# Patient Record
Sex: FEMALE | Race: WHITE | NOT HISPANIC OR LATINO | URBAN - METROPOLITAN AREA
[De-identification: names, ages, dates, MRNs, and addresses within clinical notes are randomized per-mention and may not be internally consistent; named-entity substitution may affect disease eponyms.]

---

## 2017-05-09 ENCOUNTER — OFFICE (OUTPATIENT)
Dept: URBAN - METROPOLITAN AREA CLINIC 101 | Facility: CLINIC | Age: 72
End: 2017-05-09

## 2017-05-09 VITALS
TEMPERATURE: 97.7 F | WEIGHT: 173 LBS | HEIGHT: 64 IN | DIASTOLIC BLOOD PRESSURE: 70 MMHG | HEART RATE: 65 BPM | SYSTOLIC BLOOD PRESSURE: 113 MMHG

## 2017-05-09 DIAGNOSIS — K62.5 HEMORRHAGE OF ANUS AND RECTUM: ICD-10-CM

## 2017-05-09 DIAGNOSIS — K59.09 OTHER CONSTIPATION: ICD-10-CM

## 2017-05-09 DIAGNOSIS — K22.70 BARRETT'S ESOPHAGUS WITHOUT DYSPLASIA: ICD-10-CM

## 2017-05-09 DIAGNOSIS — Z95.5 PRESENCE OF CORONARY ANGIOPLASTY IMPLANT AND GRAFT: ICD-10-CM

## 2017-05-09 PROCEDURE — 99214 OFFICE O/P EST MOD 30 MIN: CPT

## 2017-05-09 NOTE — SERVICEHPINOTES
This 71 YO patient is here to schedule an EGD and colonoscopy for Alcala's and h/o colon polyps. She feels well and is taking  Dexilant. She had Nissen fundlopication last year at [facility] which helped eliminate her coughing. She was doing well until viral illness in the winter made her vomit profusely. After this, some of her chronic cough returned. She coughs every 2-3 nights now, and she has not started any new medications. She denies heartburn, nausea, vomiting. 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 Her BRAVO revealed a very positive acid reflux study, and she had the Nissen. She takes Dexilant now.  She has chronic constipation and has a BSS type 1-2 bowel movement every 2-3 days. She also has intermittent rectal bleeding, assumed from hemorrhoid. No recent bleeding. She denies constipation, diarrhea, weight loss, and she is adopted . Her last colonoscopy in 2011 with Dr Mendiola did not reveal any polyps.

## 2017-07-05 ENCOUNTER — ON CAMPUS - OUTPATIENT (OUTPATIENT)
Dept: URBAN - METROPOLITAN AREA HOSPITAL 35 | Facility: HOSPITAL | Age: 72
End: 2017-07-05

## 2017-07-05 DIAGNOSIS — D12.4 BENIGN NEOPLASM OF DESCENDING COLON: ICD-10-CM

## 2017-07-05 DIAGNOSIS — K21.9 GASTRO-ESOPHAGEAL REFLUX DISEASE WITHOUT ESOPHAGITIS: ICD-10-CM

## 2017-07-05 DIAGNOSIS — K62.5 HEMORRHAGE OF ANUS AND RECTUM: ICD-10-CM

## 2017-07-05 PROCEDURE — 43235 EGD DIAGNOSTIC BRUSH WASH: CPT

## 2017-07-05 PROCEDURE — 45385 COLONOSCOPY W/LESION REMOVAL: CPT

## 2020-09-02 ENCOUNTER — TELEHEALTH PROVIDED OTHER THAN IN PATIENT'S HOME (OUTPATIENT)
Dept: URBAN - METROPOLITAN AREA TELEHEALTH 7 | Facility: TELEHEALTH | Age: 75
End: 2020-09-02
Payer: MEDICARE

## 2020-09-02 VITALS — WEIGHT: 160 LBS | HEIGHT: 64 IN

## 2020-09-02 DIAGNOSIS — Z79.01 LONG TERM (CURRENT) USE OF ANTICOAGULANTS: ICD-10-CM

## 2020-09-02 DIAGNOSIS — Z95.5 PRESENCE OF CORONARY ANGIOPLASTY IMPLANT AND GRAFT: ICD-10-CM

## 2020-09-02 DIAGNOSIS — D68.61 ANTIPHOSPHOLIPID SYNDROME: ICD-10-CM

## 2020-09-02 DIAGNOSIS — Z86.010 PERSONAL HISTORY OF COLONIC POLYPS: ICD-10-CM

## 2020-09-02 PROCEDURE — 99214 OFFICE O/P EST MOD 30 MIN: CPT | Mod: 95 | Performed by: PHYSICIAN ASSISTANT

## 2020-09-02 NOTE — SERVICEHPINOTES
PATIENT VERIFIED BY DATE OF BIRTH AND NAME. Patient has been consented for this telecommunication visit.   Ms. Pumphrey is here to discuss a colonoscopy. The last colonoscopy she had was in May of 2017 by Dr. Simmons and a   hepatic flexure tubular adenoma was removed given a 3 yr recall.  Has constipation which is chronic. No weight loss or blood in the stool. Has a BM once a day at the moment since adding beans to her diet. She had a stent placed in 2013 and last one was in December 2019. Cardiologist is Dr. Sahu. Sees him once per year. No chest pain or SOB. She has antiphospholipid antibody syndrome takes Plavix. No chest pain or SOB.  ROS as per HPI and o/w unremarkable.

## 2020-11-03 ENCOUNTER — ON CAMPUS - OUTPATIENT (OUTPATIENT)
Dept: URBAN - METROPOLITAN AREA HOSPITAL 37 | Facility: HOSPITAL | Age: 75
End: 2020-11-03

## 2020-11-03 DIAGNOSIS — Z86.010 PERSONAL HISTORY OF COLONIC POLYPS: ICD-10-CM

## 2020-11-03 DIAGNOSIS — K63.5 POLYP OF COLON: ICD-10-CM

## 2020-11-03 PROCEDURE — 45380 COLONOSCOPY AND BIOPSY: CPT | Mod: PT | Performed by: INTERNAL MEDICINE
